# Patient Record
(demographics unavailable — no encounter records)

---

## 2025-07-03 NOTE — ASSESSMENT
[FreeTextEntry1] : Pt recommended follow up on travels.  Pt will continue Augmentin and start new drops

## 2025-07-03 NOTE — HISTORY OF PRESENT ILLNESS
[de-identified] : 41-year-old female patient is present today for an initial evaluation for Ear Pain & Swollen. Patient states that her ears are painful she can't hear & they feel clogged. Here to get them checked. Woke up Sunday with both ears swollen. Ear moustapha placed and was given Neomycin and polymyxin drops. Does not feel any improvement. Feels like left ear is better. Right ear is worse. Denies any otorrhea. Has not been swimming. Going to fly on Tuesday to [place]. ER notes reviewed.

## 2025-07-03 NOTE — PHYSICAL EXAM
[de-identified] : moustapha removed and debris suctioned  [Normal] : mucosa is normal [Midline] : trachea located in midline position

## 2025-07-07 NOTE — ASSESSMENT
[FreeTextEntry1] : Wax found and cleaned. Cleaning well tolerated by patient. Patient felt improvement in cloginess after cleaning.  CSF powder applied.  Patient to continue ciprodex and alternate with acetic acid.  Patient to use afrin before fliying.

## 2025-07-07 NOTE — HISTORY OF PRESENT ILLNESS
[FreeTextEntry1] : Pt returns today for ear pain which she says she only has pain when she touched behind her right ear and she states she can hear out of her left ear but the right ear she is having a hard time hearing. She reports right ear is still swollen but the left ear no swelling. No pain on the left ear. Little pain on the right ear but better than before. Ear moustapha were placed in ears from last visit. Leaving on flight tomorrow.